# Patient Record
Sex: FEMALE | Race: BLACK OR AFRICAN AMERICAN | NOT HISPANIC OR LATINO | URBAN - METROPOLITAN AREA
[De-identification: names, ages, dates, MRNs, and addresses within clinical notes are randomized per-mention and may not be internally consistent; named-entity substitution may affect disease eponyms.]

---

## 2018-07-17 ENCOUNTER — EMERGENCY (EMERGENCY)
Facility: HOSPITAL | Age: 23
LOS: 1 days | Discharge: HOME | End: 2018-07-17
Admitting: EMERGENCY MEDICINE
Payer: MEDICAID

## 2018-07-17 VITALS
SYSTOLIC BLOOD PRESSURE: 141 MMHG | OXYGEN SATURATION: 97 % | TEMPERATURE: 98 F | DIASTOLIC BLOOD PRESSURE: 77 MMHG | HEART RATE: 85 BPM | RESPIRATION RATE: 16 BRPM

## 2018-07-17 PROCEDURE — 99282 EMERGENCY DEPT VISIT SF MDM: CPT | Mod: 25

## 2018-07-17 PROCEDURE — 99283 EMERGENCY DEPT VISIT LOW MDM: CPT

## 2018-07-17 NOTE — ED PROVIDER NOTE - ENMT, MLM
Airway patent, Nasal mucosa clear. Mouth with normal mucosa. Throat has no vesicles, no oropharyngeal exudates and uvula is midline. L earring stuck in earlobe - manually pushed out and then removed w/o difficulty, handed earring to pt, site cleaned w/saline, pt tolerated procedure well

## 2018-07-17 NOTE — ED ADULT NURSE NOTE - CHPI ED SYMPTOMS NEG
no red streaks/no vomiting/no chills/no drainage/no purulent drainage/no fever/no bleeding/no bleeding at site

## 2018-07-17 NOTE — ED PROVIDER NOTE - OBJECTIVE STATEMENT
The pt is a 24 y/o F, who presents to ED c/o earring stuck in ear. Pt states that back of earring stuck inside earlobe. + pain, + bleeding. Denies any other c/o

## 2018-07-17 NOTE — ED PROVIDER NOTE - MEDICAL DECISION MAKING DETAILS
the back of pt's earring pushed into earlobe, now unable to remove - removed w/o difficulty, site cleaned and wound care discussed, no earlobe lac of abscess, understands to keep earring out until earlobe fully healed

## 2018-07-21 DIAGNOSIS — H92.02 OTALGIA, LEFT EAR: ICD-10-CM

## 2024-02-26 NOTE — ED ADULT NURSE NOTE - NS ED NURSE LEVEL OF CONSCIOUSNESS SPEECH
New prescription is being authorized for Xanax 0.25 mg with the plan for the patient to take 1 tablet in the morning and afternoon and 2 tablets at bedtime.   indicates that the last refill was on January 25.   Speaking Coherently